# Patient Record
Sex: MALE | Race: WHITE | URBAN - METROPOLITAN AREA
[De-identification: names, ages, dates, MRNs, and addresses within clinical notes are randomized per-mention and may not be internally consistent; named-entity substitution may affect disease eponyms.]

---

## 2017-01-10 ENCOUNTER — HOSPITAL ENCOUNTER (EMERGENCY)
Facility: CLINIC | Age: 27
Discharge: HOME | End: 2017-01-10
Attending: PHYSICIAN ASSISTANT | Admitting: PHYSICIAN ASSISTANT
Payer: COMMERCIAL

## 2017-01-10 VITALS
TEMPERATURE: 100.3 F | HEIGHT: 71 IN | OXYGEN SATURATION: 95 % | DIASTOLIC BLOOD PRESSURE: 76 MMHG | SYSTOLIC BLOOD PRESSURE: 126 MMHG

## 2017-01-10 DIAGNOSIS — J02.9 ACUTE PHARYNGITIS, UNSPECIFIED ETIOLOGY: ICD-10-CM

## 2017-01-10 DIAGNOSIS — R50.9 FEVER, UNSPECIFIED: ICD-10-CM

## 2017-01-10 LAB
DEPRECATED S PYO AG THROAT QL EIA: NORMAL
FLUAV+FLUBV AG SPEC QL: NORMAL
FLUAV+FLUBV AG SPEC QL: NORMAL
MICRO REPORT STATUS: NORMAL
SPECIMEN SOURCE: NORMAL
SPECIMEN SOURCE: NORMAL

## 2017-01-10 PROCEDURE — 25000132 ZZH RX MED GY IP 250 OP 250 PS 637: Performed by: PHYSICIAN ASSISTANT

## 2017-01-10 PROCEDURE — 87880 STREP A ASSAY W/OPTIC: CPT | Performed by: PHYSICIAN ASSISTANT

## 2017-01-10 PROCEDURE — 87081 CULTURE SCREEN ONLY: CPT | Performed by: PHYSICIAN ASSISTANT

## 2017-01-10 PROCEDURE — 87804 INFLUENZA ASSAY W/OPTIC: CPT | Performed by: PHYSICIAN ASSISTANT

## 2017-01-10 PROCEDURE — 99283 EMERGENCY DEPT VISIT LOW MDM: CPT

## 2017-01-10 RX ORDER — ONDANSETRON 4 MG/1
4 TABLET, ORALLY DISINTEGRATING ORAL ONCE
Status: DISCONTINUED | OUTPATIENT
Start: 2017-01-10 | End: 2017-01-10 | Stop reason: HOSPADM

## 2017-01-10 RX ORDER — IBUPROFEN 200 MG
600 TABLET ORAL ONCE
Status: COMPLETED | OUTPATIENT
Start: 2017-01-10 | End: 2017-01-10

## 2017-01-10 RX ORDER — ONDANSETRON 4 MG/1
4 TABLET, ORALLY DISINTEGRATING ORAL EVERY 8 HOURS PRN
Qty: 10 TABLET | Refills: 0 | Status: SHIPPED | OUTPATIENT
Start: 2017-01-10 | End: 2017-01-13

## 2017-01-10 RX ORDER — ACETAMINOPHEN 325 MG/1
975 TABLET ORAL ONCE
Status: COMPLETED | OUTPATIENT
Start: 2017-01-10 | End: 2017-01-10

## 2017-01-10 RX ADMIN — IBUPROFEN 600 MG: 200 TABLET, FILM COATED ORAL at 17:08

## 2017-01-10 RX ADMIN — ACETAMINOPHEN 975 MG: 325 TABLET, FILM COATED ORAL at 18:13

## 2017-01-10 ASSESSMENT — ENCOUNTER SYMPTOMS
RHINORRHEA: 0
CHILLS: 1
NAUSEA: 1
DIARRHEA: 0
SORE THROAT: 1
ABDOMINAL PAIN: 0
LIGHT-HEADEDNESS: 1
COUGH: 0
FEVER: 1
HEADACHES: 1
DIZZINESS: 0
VOMITING: 1
ROS GI COMMENTS: NEGATIVE FOR HEMATEMESIS.

## 2017-01-10 NOTE — LETTER
EMERGENCY DEPARTMENT  [address] [facility] [address]  [phone]    January 10, 2017    Tico Mane  [address] [facility] [address]  [phone] (home) none (work)    : 1990      To Whom it may concern:    Tico Mane was seen in our Emergency Department today, January 10, 2017.  I expect his condition to improve over the next 1-2 days.  He may return to work when improved.    Sincerely,        Melissa Pierre

## 2017-01-10 NOTE — ED AVS SNAPSHOT
Emergency Department    [address] [facility] [address]    Phone:  [phone]    Fax:  [phone]                                       Tico Mane   MRN: [id number]    Department:   Emergency Department   Date of Visit:  1/10/2017           Patient Information     Date Of Birth          1990        Your diagnoses for this visit were:     Fever, unspecified     Acute pharyngitis, unspecified etiology        You were seen by Melissa Pierre PA-C.      Follow-up Information     Follow up with [facility].    Specialties:  Podiatry, Internal Medicine, Family Medicine    Why:  As needed    Contact information:    [address] [address] [phone]        Follow up with  Emergency Department.    Specialty:  EMERGENCY MEDICINE    Why:  If symptoms worsen    Contact information:    [address] [facility] [id number] [phone]        Discharge Instructions       Use Acetaminophen and/or Ibuprofen as needed for pain and fever.     Drink plenty of fluids and get plenty of rest.     Follow-up with primary care in 4-5 days if not improved.     Return to the ED with worsening pain, trouble swallowing or breathing, persistent vomiting, abdominal pain, or if you become worse in any way.       Discharge Instructions  Sore Throat  You were seen today for a sore throat.  Most sore throats are caused by a virus. Antibiotics do not help with viral infections, but you can fight off the virus on your own.  In this case, your sore throat would be treated with medications for your pain and fever.    Strep throat is a kind of sore throat caused by Group A streptococcus bacteria.  This type of sore throat is treated with antibiotics.  If you had a rapid test done today for strep throat and it did not show infection, we always do a culture. If the culture shows you have strep throat, we will call you and get you a prescription  "for antibiotics.    Return to the Emergency Department if:    If you have difficulty breathing.    If you are drooling because you are unable to swallow.    You become dehydrated due to difficulty drinking. Signs of dehydration include weakness, dry mouth, and urinating less than 3 times per day.    If you develop swelling of the neck or tongue.    If you develop a high fever with either headache or stiff neck.    Treatment:      Pain relief -- Non-prescription pain medications, such as Tylenol  (acetaminophen) or Motrin , Advil  (ibuprofen) are usually recommended for pain.  Do not use a medicine that you are allergic to, or if your doctor has told you not to use it.   If you have been given a narcotic such as Vicodin  (hydrocodone with acetaminophen), Percocet  (oxycodone with acetaminophen), codeine, do not drive for four hours after you have taken it.  If the narcotic contains Tylenol  (acetaminophen), do not take Tylenol  with it. All narcotics will cause constipation, so eat a high fiber diet.      If you have been placed on antibiotics, watch for signs of allergic reaction.  These include rash, lip swelling, difficulty breathing, wheezing, and dizziness.  If you develop any of these symptoms, stop the antibiotic immediately and go to an Emergency Department or Urgent Care for evaluation.    Probiotics: If you have been given an antibiotic, you may want to also take a probiotic pill or eat yogurt with live cultures. Probiotics have \"good bacteria\" to help your intestines stay healthy. Studies have shown that probiotics help prevent diarrhea and other intestine problems (including C. diff infection) when you take antibiotics. You can buy these without a prescription in the pharmacy section of the store.   If you were given a prescription for medicine here today, be sure to read all of the information (including the package insert) that comes with your prescription.  This will include important information about " the medicine, its side effects, and any warnings that you need to know about.  The pharmacist who fills the prescription can provide more information and answer questions you may have about the medicine.  If you have questions or concerns that the pharmacist cannot address, please call or return to the Emergency Department.         Discharge Instructions  Vomiting    You have been seen today for vomiting. This is usually caused by a virus, but some bacteria, parasites, medicines or other medical conditions can cause similar symptoms. At this time your doctor does not find that your vomiting is a sign of anything dangerous or life-threatening. However, sometimes the signs of serious illness do not show up right away. If you have new or worse symptoms, you may need to be seen again in the emergency department or by your primary doctor. Remember that serious problems like appendicitis can start as vomiting.     Return to the Emergency Department if:    You keep throwing up and you are not able to keep liquids down.     You feel you are getting dehydrated, such as being very thirsty, not urinating at least every 8-12 hours, or feeling faint or lightheaded.     You develop a new fever, or your fever continues for more than 2 days.     You have belly pain that seems worse than cramps, is in one spot, or is getting worse over time.     You have blood in your vomit or stools.     You feel very weak.    You are not starting to improve within 24 hours of your visit here.     What can I do to help myself?    The most important thing to do is to drink clear liquids. If you have been vomiting a lot, it is best to have only small, frequent sips of liquids. Drinking too much at once may cause more vomiting. If you are vomiting often, you must replace minerals, sodium and potassium lost with your illness. Pedialyte  and sports drinks can help you replace these minerals. You can also drink clear liquids such as water, weak tea,  apple juice, and 7-Up . Avoid acid liquids (orange), caffeine (coffee) or alcohol. Do not drink milk until you no longer have diarrhea.     After liquids are staying down, you may start eating mild foods. Soda crackers, toast, plain noodles, gelatin, applesauce and bananas are good first choices. Avoid foods that have acid, are spicy, fatty or have a lot of fiber (such as meats, coarse grains, vegetables). You may start eating these foods again in about 3 days when you are better.     Sometimes treatment includes prescription medicine to prevent nausea and vomiting. If your doctor prescribes these for you, take them as directed.     Don t take ibuprofen, or other nonsteroidal anti-inflammatory medicines without checking with your healthcare provider.   If you were given a prescription for medicine here today, be sure to read all of the information (including the package insert) that comes with your prescription.  This will include important information about the medicine, its side effects, and any warnings that you need to know about.  The pharmacist who fills the prescription can provide more information and answer questions you may have about the medicine.  If you have questions or concerns that the pharmacist cannot address, please call or return to the Emergency Department.     Remember that you can always come back to the Emergency Department if you are not able to see your regular doctor in the amount of time listed above, if you get any new symptoms, or if there is anything that worries you.                24 Hour Appointment Hotline       To make an appointment at any [facility], call [phone] ([phone]). If you don't have a family doctor or clinic, we will help you find one. [place] clinics are conveniently located to serve the needs of you and your family.             Review of your medicines      START taking        Dose / Directions Last dose taken    ondansetron 4 MG ODT tab   Commonly  known as:  ZOFRAN ODT   Dose:  4 mg   Quantity:  10 tablet        Take 1 tablet (4 mg) by mouth every 8 hours as needed for nausea   Refills:  0                Prescriptions were sent or printed at these locations (1 Prescription)                   Other Prescriptions                Printed at Department/Unit printer (1 of 1)         ondansetron (ZOFRAN ODT) 4 MG ODT tab                Procedures and tests performed during your visit     Beta strep group A culture    Influenza A/B antigen    Rapid strep screen      Orders Needing Specimen Collection     None      Pending Results     Date and Time Order Name Status Description    1/10/2017 1700 Beta strep group A culture In process             Pending Culture Results     Date and Time Order Name Status Description    1/10/2017 1700 Beta strep group A culture In process        Test Results from your hospital stay           1/10/2017  5:16 PM - Interface, Flexilab Results      Component Results     Component    Specimen Description    Throat    Rapid Strep A Screen    NEGATIVE: No Group A streptococcal antigen detected by immunoassay, await   culture report.      Micro Report Status    FINAL 01/10/2017         1/10/2017  5:19 PM - Interface, Flexilab Results         1/10/2017  5:53 PM - Interface, Flexilab Results      Component Results     Component Value Ref Range & Units Status    Influenza A/B Agn Specimen Nasopharyngeal  Final    Influenza A  NEG Final    Negative   Test results must be correlated with clinical data. If necessary, results   should be confirmed by a molecular assay or viral culture.      Influenza B  NEG Final    Negative   Test results must be correlated with clinical data. If necessary, results   should be confirmed by a molecular assay or viral culture.                  Clinical Quality Measure: Blood Pressure Screening     Your blood pressure was checked while you were in the emergency department today. The last reading we obtained was  BP:  "126/76 mmHg . Please read the guidelines below about what these numbers mean and what you should do about them.  If your systolic blood pressure (the top number) is less than 120 and your diastolic blood pressure (the bottom number) is less than 80, then your blood pressure is normal. There is nothing more that you need to do about it.  If your systolic blood pressure (the top number) is 120-139 or your diastolic blood pressure (the bottom number) is 80-89, your blood pressure may be higher than it should be. You should have your blood pressure rechecked within a year by a primary care provider.  If your systolic blood pressure (the top number) is 140 or greater or your diastolic blood pressure (the bottom number) is 90 or greater, you may have high blood pressure. High blood pressure is treatable, but if left untreated over time it can put you at risk for heart attack, stroke, or kidney failure. You should have your blood pressure rechecked by a primary care provider within the next 4 weeks.  If your provider in the emergency department today gave you specific instructions to follow-up with your doctor or provider even sooner than that, you should follow that instruction and not wait for up to 4 weeks for your follow-up visit.        Thank you for choosing [facility]       Thank you for choosing [facility] for your care. Our goal is always to provide you with excellent care. Hearing back from our patients is one way we can continue to improve our services. Please take a few minutes to complete the written survey that you may receive in the mail after you visit with us. Thank you!        [facility] Information     [employer] lets you send messages to your doctor, view your test results, renew your prescriptions, schedule appointments and more. To sign up, go to [email or web address] . Click on \"Log in\" on the left side of the screen, which will take you to the Welcome page. Then click on \"Sign up Now\" on the right side of " the page.     You will be asked to enter the access code listed below, as well as some personal information. Please follow the directions to create your username and password.     Your access code is: [email or web address]  Expires: 4/10/2017  6:25 PM     Your access code will  in 90 days. If you need help or a new code, please call your [facility] clinic or [phone].        Care EveryWhere ID     This is your Care EveryWhere ID. This could be used by other organizations to access your [facility] medical records  [id number]        After Visit Summary       This is your record. Keep this with you and show to your community pharmacist(s) and doctor(s) at your next visit.

## 2017-01-10 NOTE — ED AVS SNAPSHOT
Emergency Department    [address] [place] [address]    Phone:  [phone]    Fax:  [phone]                                       Tico Mane   MRN: [id number]    Department:   Emergency Department   Date of Visit:  1/10/2017           After Visit Summary Signature Page     I have received my discharge instructions, and my questions have been answered. I have discussed any challenges I see with this plan with the nurse or doctor.    ..........................................................................................................................................  Patient/Patient Representative Signature      ..........................................................................................................................................  Patient Representative Print Name and Relationship to Patient    ..................................................               ................................................  Date                                            Time    ..........................................................................................................................................  Reviewed by Signature/Title    ...................................................              ..............................................  Date                                                            Time

## 2017-01-10 NOTE — ED PROVIDER NOTES
"  History     Chief Complaint:  Fever    HPI   Tico Mane is a 26 year old male who presents with a fever. Tico states that he is currently visiting [place] from [place] for work training. Yesterday morning the patient had onset of a sore throat. Then later around 1600 he started experiencing chills, generalized body soreness, mild headache, light headedness and a 102 degree fever. The patient tried Tylenol which caused him to have nausea and have one episode of emesis; he denies any hematemesis. At this time the patient denies dizziness, cough, diarrhea, abdominal pain, rhinorrhea, ear pain and congestion.     Allergies:  The patient has no known drug allergies.    Medications:    The patient is not currently taking any prescribed medications.    Past Medical History:    History reviewed.  No significant past medical history.     Past Surgical History:    History reviewed.  No significant past surgical history.      Family History:    History reviewed.  No significant family history.      Social History:  Tobacco use: neg  Alcohol use: pos  The patient presents alone. He is here in [place] from [place] for work training.      Review of Systems   Constitutional: Positive for fever and chills.        Positive for generalized body aches.    HENT: Positive for sore throat. Negative for congestion, ear pain and rhinorrhea.    Respiratory: Negative for cough.    Gastrointestinal: Positive for nausea and vomiting. Negative for abdominal pain and diarrhea.        Negative for hematemesis.    Neurological: Positive for light-headedness and headaches. Negative for dizziness.   All other systems reviewed and are negative.      Physical Exam   First Vitals:  BP: 126/76 mmHg  Temp: 100.3  F (37.9  C)  Temp src: Oral  SpO2: 95 %  Height: 180.3 cm (5' 11\")       Physical Exam  General: Resting comfortably on the gurney.    Head:  The scalp, head and face appear normal. No evidence of trauma.   ENT:  Pupils are equal, round " and reactive to light.     Oropharynx is moist.  No uvular deviation. Posterior oropharynx is erythematous with bilateral tonsillar swelling, R>L, no exudate.     Ear canals patent bilaterally.     Left TM clear with no erythema, dullness or effusion. Right TM clear with no erythema, dullness or effusion.  Neck:  Supple, no rigidity noted. Normal ROM.     Trachea midline. No mass detected.    Lymph: Cervical lymphadenopathy noted.   Resp:  Non-labored breathing. No tachypnea.     Lung fields clear to auscultation without wheezes or rales.   CV:  Regular rate and rhythm. Normal S1 and S2, no S3 or S4.     No pathological murmur detected.   GI:  Abdomen is soft and non-distended.     Non-tender to palpation in all four quadrants.      No masses or organomegaly.   MS:  Normal muscular tone.   Neuro: Awake and alert. Speech is clear.   Skin:  No rash or pallor.  Psych:  Normal affect. Appropriate interactions.       Emergency Department Course     Laboratory:  Rapid strep screen: Negative  Beta strep group A culture: Pending    Interventions:  1708: Ibuprofen, 600 mg, PO    Emergency Department Course:  Nursing notes and vitals reviewed.  I performed an exam of the patient as documented above.  The above workup was undertaken.  1806: I rechecked the patient and discussed results.  Findings and plan explained to the Patient. Patient discharged home with instructions regarding supportive care, medications, and reasons to return. The importance of close follow-up was reviewed. The patient was prescribed Zofran ODT.     Impression & Plan      Medical Decision Making:  Tico Mane is a 26 year old male presented with sore throat and clinical evidence of pharyngitis.  The rapid strep test is negative, and formal culture has been set up in the lab. There is no clinical evidence of  peritonsillar abscess, retropharyngeal abscess, Lemierre's Syndrome, epiglottis, or Tito's angina. The etiology is most likely viral.  The  patient's symptoms are consistent with viral pharyngitis.  I have recommended treatment with analgesics, and we will await formal culture results.  If the culture is positive, an ED physician will call the patient to initiate anti-microbial therapy.  Influenza was also checked due to fevers and body aches, this was also negative. Regarding his vomiting, this has been mild, he has been keeping fluids down, and he has no abdominal pain with a benign abdominal exam. At this time I suspect this is due to his viral process and he does not require further work-up for this in the ED.  Follow-up with primary physician if not improving in 3-5 days. I discussed the results, plan and any additional questions with the patient. He verbalized understanding and agreement with the plan.  We discussed reasons to return to the ED including increasing pain, change in voice, neck pain, vomiting, fever, shortness of breath or if he becomes worse in any way.     Diagnosis:    ICD-10-CM   1. Fever, unspecified R50.9   2. Acute pharyngitis, unspecified etiology J02.9     Disposition:  Discharge to home with primary care follow up.    Discharge Medications:  New Prescriptions    ONDANSETRON (ZOFRAN ODT) 4 MG ODT TAB    Take 1 tablet (4 mg) by mouth every 8 hours as needed for nausea     I, Roger Olivares, am serving as a scribe on 1/10/2017 at 4:54 PM to personally document services performed by Melissa Pierre PA-C, based on my observations and the provider's statements to me.     EMERGENCY DEPARTMENT        Melissa Pierre PA-C  01/10/17 2149

## 2017-01-11 NOTE — DISCHARGE INSTRUCTIONS
Use Acetaminophen and/or Ibuprofen as needed for pain and fever.     Drink plenty of fluids and get plenty of rest.     Follow-up with primary care in 4-5 days if not improved.     Return to the ED with worsening pain, trouble swallowing or breathing, persistent vomiting, abdominal pain, or if you become worse in any way.       Discharge Instructions  Sore Throat  You were seen today for a sore throat.  Most sore throats are caused by a virus. Antibiotics do not help with viral infections, but you can fight off the virus on your own.  In this case, your sore throat would be treated with medications for your pain and fever.    Strep throat is a kind of sore throat caused by Group A streptococcus bacteria.  This type of sore throat is treated with antibiotics.  If you had a rapid test done today for strep throat and it did not show infection, we always do a culture. If the culture shows you have strep throat, we will call you and get you a prescription for antibiotics.    Return to the Emergency Department if:    If you have difficulty breathing.    If you are drooling because you are unable to swallow.    You become dehydrated due to difficulty drinking. Signs of dehydration include weakness, dry mouth, and urinating less than 3 times per day.    If you develop swelling of the neck or tongue.    If you develop a high fever with either headache or stiff neck.    Treatment:      Pain relief -- Non-prescription pain medications, such as Tylenol  (acetaminophen) or Motrin , Advil  (ibuprofen) are usually recommended for pain.  Do not use a medicine that you are allergic to, or if your doctor has told you not to use it.   If you have been given a narcotic such as Vicodin  (hydrocodone with acetaminophen), Percocet  (oxycodone with acetaminophen), codeine, do not drive for four hours after you have taken it.  If the narcotic contains Tylenol  (acetaminophen), do not take Tylenol  with it. All narcotics will cause  "constipation, so eat a high fiber diet.      If you have been placed on antibiotics, watch for signs of allergic reaction.  These include rash, lip swelling, difficulty breathing, wheezing, and dizziness.  If you develop any of these symptoms, stop the antibiotic immediately and go to an Emergency Department or Urgent Care for evaluation.    Probiotics: If you have been given an antibiotic, you may want to also take a probiotic pill or eat yogurt with live cultures. Probiotics have \"good bacteria\" to help your intestines stay healthy. Studies have shown that probiotics help prevent diarrhea and other intestine problems (including C. diff infection) when you take antibiotics. You can buy these without a prescription in the pharmacy section of the store.   If you were given a prescription for medicine here today, be sure to read all of the information (including the package insert) that comes with your prescription.  This will include important information about the medicine, its side effects, and any warnings that you need to know about.  The pharmacist who fills the prescription can provide more information and answer questions you may have about the medicine.  If you have questions or concerns that the pharmacist cannot address, please call or return to the Emergency Department.         Discharge Instructions  Vomiting    You have been seen today for vomiting. This is usually caused by a virus, but some bacteria, parasites, medicines or other medical conditions can cause similar symptoms. At this time your doctor does not find that your vomiting is a sign of anything dangerous or life-threatening. However, sometimes the signs of serious illness do not show up right away. If you have new or worse symptoms, you may need to be seen again in the emergency department or by your primary doctor. Remember that serious problems like appendicitis can start as vomiting.     Return to the Emergency Department if:    You keep " throwing up and you are not able to keep liquids down.     You feel you are getting dehydrated, such as being very thirsty, not urinating at least every 8-12 hours, or feeling faint or lightheaded.     You develop a new fever, or your fever continues for more than 2 days.     You have belly pain that seems worse than cramps, is in one spot, or is getting worse over time.     You have blood in your vomit or stools.     You feel very weak.    You are not starting to improve within 24 hours of your visit here.     What can I do to help myself?    The most important thing to do is to drink clear liquids. If you have been vomiting a lot, it is best to have only small, frequent sips of liquids. Drinking too much at once may cause more vomiting. If you are vomiting often, you must replace minerals, sodium and potassium lost with your illness. Pedialyte  and sports drinks can help you replace these minerals. You can also drink clear liquids such as water, weak tea, apple juice, and 7-Up . Avoid acid liquids (orange), caffeine (coffee) or alcohol. Do not drink milk until you no longer have diarrhea.     After liquids are staying down, you may start eating mild foods. Soda crackers, toast, plain noodles, gelatin, applesauce and bananas are good first choices. Avoid foods that have acid, are spicy, fatty or have a lot of fiber (such as meats, coarse grains, vegetables). You may start eating these foods again in about 3 days when you are better.     Sometimes treatment includes prescription medicine to prevent nausea and vomiting. If your doctor prescribes these for you, take them as directed.     Don t take ibuprofen, or other nonsteroidal anti-inflammatory medicines without checking with your healthcare provider.   If you were given a prescription for medicine here today, be sure to read all of the information (including the package insert) that comes with your prescription.  This will include important information about the  medicine, its side effects, and any warnings that you need to know about.  The pharmacist who fills the prescription can provide more information and answer questions you may have about the medicine.  If you have questions or concerns that the pharmacist cannot address, please call or return to the Emergency Department.     Remember that you can always come back to the Emergency Department if you are not able to see your regular doctor in the amount of time listed above, if you get any new symptoms, or if there is anything that worries you.

## 2017-01-12 LAB
BACTERIA SPEC CULT: NORMAL
MICRO REPORT STATUS: NORMAL
SPECIMEN SOURCE: NORMAL